# Patient Record
Sex: FEMALE | Race: WHITE | NOT HISPANIC OR LATINO | URBAN - METROPOLITAN AREA
[De-identification: names, ages, dates, MRNs, and addresses within clinical notes are randomized per-mention and may not be internally consistent; named-entity substitution may affect disease eponyms.]

---

## 2023-07-06 ENCOUNTER — OFFICE VISIT (OUTPATIENT)
Dept: PRIMARY CARE | Facility: CLINIC | Age: 68
End: 2023-07-06
Payer: MEDICARE

## 2023-07-06 VITALS
BODY MASS INDEX: 36.44 KG/M2 | RESPIRATION RATE: 18 BRPM | WEIGHT: 219 LBS | OXYGEN SATURATION: 97 % | DIASTOLIC BLOOD PRESSURE: 78 MMHG | TEMPERATURE: 97.3 F | SYSTOLIC BLOOD PRESSURE: 131 MMHG | HEART RATE: 81 BPM

## 2023-07-06 DIAGNOSIS — E06.9 THYROIDITIS: ICD-10-CM

## 2023-07-06 DIAGNOSIS — M54.16 LUMBAR RADICULOPATHY: Primary | ICD-10-CM

## 2023-07-06 PROBLEM — M25.552 BILATERAL HIP PAIN: Status: ACTIVE | Noted: 2023-01-18

## 2023-07-06 PROBLEM — M17.0 PRIMARY OSTEOARTHRITIS OF BOTH KNEES: Status: ACTIVE | Noted: 2022-12-06

## 2023-07-06 PROBLEM — E66.9 OBESITY: Status: ACTIVE | Noted: 2023-07-06

## 2023-07-06 PROBLEM — L25.9 CONTACT DERMATITIS: Status: ACTIVE | Noted: 2023-07-06

## 2023-07-06 PROBLEM — M25.559 GREATER TROCHANTERIC PAIN SYNDROME: Status: ACTIVE | Noted: 2023-06-29

## 2023-07-06 PROBLEM — F41.9 ANXIETY: Status: ACTIVE | Noted: 2023-07-06

## 2023-07-06 PROBLEM — J45.909 ASTHMA (HHS-HCC): Status: ACTIVE | Noted: 2023-07-06

## 2023-07-06 PROBLEM — M25.50 JOINT PAIN: Status: ACTIVE | Noted: 2023-07-06

## 2023-07-06 PROBLEM — T22.212A SECOND DEGREE BURN OF LEFT FOREARM: Status: ACTIVE | Noted: 2018-12-27

## 2023-07-06 PROBLEM — I88.9 CERVICAL LYMPHADENITIS: Status: ACTIVE | Noted: 2020-04-14

## 2023-07-06 PROBLEM — D17.9 LIPOMA: Status: ACTIVE | Noted: 2023-07-06

## 2023-07-06 PROBLEM — M25.551 BILATERAL HIP PAIN: Status: ACTIVE | Noted: 2023-01-18

## 2023-07-06 PROBLEM — K13.0 CHEILITIS: Status: ACTIVE | Noted: 2023-07-06

## 2023-07-06 PROBLEM — E78.5 HYPERLIPIDEMIA: Status: ACTIVE | Noted: 2023-07-06

## 2023-07-06 PROBLEM — S39.012A LUMBAR STRAIN: Status: ACTIVE | Noted: 2023-07-06

## 2023-07-06 PROCEDURE — 1036F TOBACCO NON-USER: CPT | Performed by: FAMILY MEDICINE

## 2023-07-06 PROCEDURE — 1159F MED LIST DOCD IN RCRD: CPT | Performed by: FAMILY MEDICINE

## 2023-07-06 PROCEDURE — 99214 OFFICE O/P EST MOD 30 MIN: CPT | Performed by: FAMILY MEDICINE

## 2023-07-06 RX ORDER — ESCITALOPRAM OXALATE 10 MG/1
10 TABLET ORAL DAILY
COMMUNITY

## 2023-07-06 RX ORDER — PREDNISONE 10 MG/1
TABLET ORAL
Qty: 45 TABLET | Refills: 0 | Status: SHIPPED | OUTPATIENT
Start: 2023-07-06 | End: 2023-07-21

## 2023-07-06 RX ORDER — LORAZEPAM 0.5 MG/1
1 TABLET ORAL 3 TIMES DAILY PRN
COMMUNITY
Start: 2021-10-20

## 2023-07-06 ASSESSMENT — ENCOUNTER SYMPTOMS
OCCASIONAL FEELINGS OF UNSTEADINESS: 0
DEPRESSION: 0
LOSS OF SENSATION IN FEET: 0

## 2023-07-06 NOTE — PROGRESS NOTES
Subjective   Patient ID: Deirdre Mascorro is a 67 y.o. female who presents for Hip Pain (left) and Follow-up (Wants thyroid U/S).  HPI    Has cortisone injection about 2 weeks ago, which continues to help for her left lateral hip pain however it has not resolved her left lower buttock pain that radiates into her left leg.  She states this has been worsening over the last few weeks.  She denies any bowel or bladder incontinence.      Patient is due for repeat ultrasound of her thyroid    Patient overall states her mood has been okay she is tolerating her Lexapro well with good compliance she denies any adverse effects.      Review of Systems    Objective   Physical Exam  Constitutional:       Appearance: Normal appearance.   Cardiovascular:      Rate and Rhythm: Normal rate and regular rhythm.   Pulmonary:      Effort: Pulmonary effort is normal.      Breath sounds: Normal breath sounds.   Abdominal:      General: Abdomen is flat. Bowel sounds are normal.      Palpations: Abdomen is soft.   Musculoskeletal:      Cervical back: Normal range of motion and neck supple.      Comments: Inspection of lumbar spine normal, no tenderness to palpation, full range of motion in lumbar spine   Skin:     General: Skin is warm and dry.   Neurological:      Mental Status: She is alert.   Psychiatric:         Mood and Affect: Mood normal.         Behavior: Behavior normal.         Assessment/Plan   Problem List Items Addressed This Visit       Thyroiditis - Primary    Relevant Orders    US thyroid    Lumbar radiculopathy    Relevant Medications    predniSONE (Deltasone) 10 mg tablet    Other Relevant Orders    XR lumbar spine 2-3 views    Referral to Physical Therapy

## 2023-09-05 ENCOUNTER — OFFICE VISIT (OUTPATIENT)
Dept: PRIMARY CARE | Facility: CLINIC | Age: 68
End: 2023-09-05
Payer: MEDICARE

## 2023-09-05 VITALS
WEIGHT: 225 LBS | RESPIRATION RATE: 18 BRPM | SYSTOLIC BLOOD PRESSURE: 130 MMHG | OXYGEN SATURATION: 96 % | TEMPERATURE: 97.6 F | DIASTOLIC BLOOD PRESSURE: 82 MMHG | BODY MASS INDEX: 37.44 KG/M2 | HEART RATE: 74 BPM

## 2023-09-05 DIAGNOSIS — M25.552 LEFT HIP PAIN: ICD-10-CM

## 2023-09-05 DIAGNOSIS — J45.20 MILD INTERMITTENT ASTHMA, UNSPECIFIED WHETHER COMPLICATED (HHS-HCC): ICD-10-CM

## 2023-09-05 DIAGNOSIS — M54.16 LUMBAR RADICULOPATHY: Primary | ICD-10-CM

## 2023-09-05 PROCEDURE — 99214 OFFICE O/P EST MOD 30 MIN: CPT | Performed by: FAMILY MEDICINE

## 2023-09-05 PROCEDURE — 1036F TOBACCO NON-USER: CPT | Performed by: FAMILY MEDICINE

## 2023-09-05 PROCEDURE — 90662 IIV NO PRSV INCREASED AG IM: CPT | Performed by: FAMILY MEDICINE

## 2023-09-05 PROCEDURE — G0008 ADMIN INFLUENZA VIRUS VAC: HCPCS | Performed by: FAMILY MEDICINE

## 2023-09-05 PROCEDURE — 1159F MED LIST DOCD IN RCRD: CPT | Performed by: FAMILY MEDICINE

## 2023-09-05 RX ORDER — GABAPENTIN 300 MG/1
300 CAPSULE ORAL NIGHTLY
Qty: 30 CAPSULE | Refills: 2 | Status: SHIPPED | OUTPATIENT
Start: 2023-09-05 | End: 2023-12-04

## 2023-09-05 RX ORDER — ALBUTEROL SULFATE 90 UG/1
2 AEROSOL, METERED RESPIRATORY (INHALATION) EVERY 4 HOURS PRN
Qty: 8.5 G | Refills: 3 | Status: SHIPPED | OUTPATIENT
Start: 2023-09-05 | End: 2024-09-04

## 2023-09-05 NOTE — PROGRESS NOTES
Subjective   Patient ID: Deirdre Mascorro is a 67 y.o. female who presents for Hip Pain (Left x 2 months).  HPI    Left hip pain worsening, seems to start in lower back, wrap around groin into anterior thigh and anterior lower leg.  Patient states it often feels like she is going to give out on her although it has not yet.    Patient denies any bowel or bladder dysfunction    Course of prednisone in July did not help     Has not yet started physical therapy    Review of Systems    Objective   Physical Exam  Constitutional:       Appearance: Normal appearance.   Cardiovascular:      Rate and Rhythm: Normal rate and regular rhythm.   Pulmonary:      Effort: Pulmonary effort is normal.      Breath sounds: Normal breath sounds.   Musculoskeletal:      Comments: Inspection of lumbar spine normal, tender to palpation over left SI joint and left lumbar paraspinals.  Some discomfort with forward flexion and extension.   Skin:     General: Skin is warm and dry.   Neurological:      Mental Status: She is alert.   Psychiatric:         Mood and Affect: Mood normal.         Behavior: Behavior normal.         Assessment/Plan   Problem List Items Addressed This Visit       Asthma    Relevant Medications    albuterol (ProAir HFA) 90 mcg/actuation inhaler    Left hip pain    Lumbar radiculopathy - Primary    Relevant Medications    gabapentin (Neurontin) 300 mg capsule    Other Relevant Orders    Referral to Physical Therapy

## 2023-09-07 DIAGNOSIS — M16.0 PRIMARY OSTEOARTHRITIS OF BOTH HIPS: Primary | ICD-10-CM

## 2023-09-22 ENCOUNTER — TELEPHONE (OUTPATIENT)
Dept: PRIMARY CARE | Facility: CLINIC | Age: 68
End: 2023-09-22
Payer: MEDICARE

## 2023-09-22 NOTE — TELEPHONE ENCOUNTER
Irma called regarding her hip pain.  She said that you had referred her to a Dr. Ramírez who does not do hips.  She would like for you to look over her medications (which aren't doing her any good) and let her know if there are any she can take together or what you would recommend?  She would like for you to give her a call back.  Thank you

## 2023-09-22 NOTE — TELEPHONE ENCOUNTER
Spoke to patient, continue with the steroid until complete, then begin meloxicam daily.  She may also take Tylenol up to 4 times daily and use Percocet as needed.  She does have an old prescription from when she fractured her elbow.  She feels like she will only need this to get home she is flying out to [place] tomorrow.  Patient does have an appointment with a hip surgeon on Tuesday in [place].

## 2023-09-28 ENCOUNTER — TELEPHONE (OUTPATIENT)
Dept: PRIMARY CARE | Facility: CLINIC | Age: 68
End: 2023-09-28
Payer: MEDICARE

## 2023-09-28 DIAGNOSIS — R92.8 ABNORMAL MAMMOGRAM OF BOTH BREASTS: Primary | ICD-10-CM

## 2023-09-28 NOTE — TELEPHONE ENCOUNTER
[facility] [place] phoned, [phone]. They are requesting that we fax orders for Deirdre Mascorro for a mammogram diagnostic bilateral test to fax number: [phone]. She has an appointment on Monday morning. I can fax these when ready.

## 2023-10-27 ENCOUNTER — APPOINTMENT (OUTPATIENT)
Dept: ORTHOPEDIC SURGERY | Facility: CLINIC | Age: 68
End: 2023-10-27
Payer: MEDICARE

## 2023-12-21 ENCOUNTER — APPOINTMENT (OUTPATIENT)
Dept: PRIMARY CARE | Facility: CLINIC | Age: 68
End: 2023-12-21
Payer: MEDICARE

## 2025-05-13 ENCOUNTER — APPOINTMENT (OUTPATIENT)
Dept: PRIMARY CARE | Facility: CLINIC | Age: 70
End: 2025-05-13
Payer: MEDICARE

## 2025-05-13 VITALS
HEIGHT: 67 IN | TEMPERATURE: 96.9 F | HEART RATE: 84 BPM | DIASTOLIC BLOOD PRESSURE: 72 MMHG | SYSTOLIC BLOOD PRESSURE: 117 MMHG | BODY MASS INDEX: 34.69 KG/M2 | WEIGHT: 221 LBS | RESPIRATION RATE: 16 BRPM | OXYGEN SATURATION: 98 %

## 2025-05-13 DIAGNOSIS — J45.20 MILD INTERMITTENT ASTHMA, UNSPECIFIED WHETHER COMPLICATED (HHS-HCC): Primary | ICD-10-CM

## 2025-05-13 DIAGNOSIS — E66.811 OBESITY (BMI 30.0-34.9): ICD-10-CM

## 2025-05-13 DIAGNOSIS — E04.1 THYROID NODULE: ICD-10-CM

## 2025-05-13 PROBLEM — E66.9 OBESITY: Status: RESOLVED | Noted: 2023-07-06 | Resolved: 2025-05-13

## 2025-05-13 PROCEDURE — G2211 COMPLEX E/M VISIT ADD ON: HCPCS | Performed by: FAMILY MEDICINE

## 2025-05-13 PROCEDURE — 99214 OFFICE O/P EST MOD 30 MIN: CPT | Performed by: FAMILY MEDICINE

## 2025-05-13 PROCEDURE — 1036F TOBACCO NON-USER: CPT | Performed by: FAMILY MEDICINE

## 2025-05-13 PROCEDURE — 3008F BODY MASS INDEX DOCD: CPT | Performed by: FAMILY MEDICINE

## 2025-05-13 PROCEDURE — 1159F MED LIST DOCD IN RCRD: CPT | Performed by: FAMILY MEDICINE

## 2025-05-13 RX ORDER — CELECOXIB 200 MG/1
1 CAPSULE ORAL
COMMUNITY
Start: 2025-02-06

## 2025-05-13 RX ORDER — ALBUTEROL SULFATE 90 UG/1
2 INHALANT RESPIRATORY (INHALATION) EVERY 4 HOURS PRN
Qty: 8.5 G | Refills: 3 | Status: SHIPPED | OUTPATIENT
Start: 2025-05-13 | End: 2026-05-13

## 2025-05-13 RX ORDER — HYDROCHLOROTHIAZIDE 25 MG/1
1 TABLET ORAL
COMMUNITY
Start: 2025-03-19

## 2025-05-13 RX ORDER — FLUTICASONE FUROATE AND VILANTEROL 100; 25 UG/1; UG/1
1 POWDER RESPIRATORY (INHALATION) DAILY
Qty: 60 EACH | Refills: 5 | Status: SHIPPED | OUTPATIENT
Start: 2025-05-13 | End: 2025-05-14 | Stop reason: SDUPTHER

## 2025-05-13 ASSESSMENT — PATIENT HEALTH QUESTIONNAIRE - PHQ9
SUM OF ALL RESPONSES TO PHQ9 QUESTIONS 1 AND 2: 0
2. FEELING DOWN, DEPRESSED OR HOPELESS: NOT AT ALL
1. LITTLE INTEREST OR PLEASURE IN DOING THINGS: NOT AT ALL

## 2025-05-13 NOTE — ASSESSMENT & PLAN NOTE
Orders:    fluticasone furoate-vilanteroL (Breo Ellipta) 100-25 mcg/dose inhaler; Inhale 1 puff once daily.    albuterol (ProAir HFA) 90 mcg/actuation inhaler; Inhale 2 puffs every 4 hours if needed for wheezing or shortness of breath.

## 2025-05-13 NOTE — ASSESSMENT & PLAN NOTE
Orders:    tirzepatide, weight loss, (Zepbound) 2.5 mg/0.5 mL injection; Inject 2.5 mg under the skin every 7 days.  If Zepbound is not covered we will call in topiramate

## 2025-05-13 NOTE — PROGRESS NOTES
Subjective   Patient ID: Deirdre Mascorro is a 69 y.o. female who presents for questions about meds and Asthma.  HPI    Dry cough worse for about 1 month, breathing was better when at sons where it was cooler.  She states that she frequently feels short of breath particularly if she has been more active or even if she is standing still but she thinks sometimes she does not take deep enough breaths.  She is unaware of any wheezing.      Activity has just started to  after her bilateral hip replacements, but she is starting to walk more.  She states that she was eating more salads focusing on protein and vegetables without significant weight loss.  She is been following this diet for approximately a year or more     Review of Systems    Objective   Physical Exam  Constitutional:       Appearance: Normal appearance.   Cardiovascular:      Rate and Rhythm: Normal rate and regular rhythm.   Pulmonary:      Effort: Pulmonary effort is normal.      Breath sounds: Normal breath sounds.   Abdominal:      General: Abdomen is flat. Bowel sounds are normal.      Palpations: Abdomen is soft.   Skin:     General: Skin is warm and dry.   Neurological:      Mental Status: She is alert.   Psychiatric:         Mood and Affect: Mood normal.         Behavior: Behavior normal.         Assessment & Plan  Thyroid nodule    Orders:    US thyroid; Future    Mild intermittent asthma, unspecified whether complicated ([facility])    Orders:    fluticasone furoate-vilanteroL (Breo Ellipta) 100-25 mcg/dose inhaler; Inhale 1 puff once daily.    albuterol (ProAir HFA) 90 mcg/actuation inhaler; Inhale 2 puffs every 4 hours if needed for wheezing or shortness of breath.    Obesity (BMI 30.0-34.9)    Orders:    tirzepatide, weight loss, (Zepbound) 2.5 mg/0.5 mL injection; Inject 2.5 mg under the skin every 7 days.  If Zepbound is not covered we will call in topiramate     Follow-up in approximately 6 weeks

## 2025-05-14 DIAGNOSIS — J45.20 MILD INTERMITTENT ASTHMA, UNSPECIFIED WHETHER COMPLICATED (HHS-HCC): ICD-10-CM

## 2025-05-14 RX ORDER — FLUTICASONE FUROATE AND VILANTEROL TRIFENATATE 100; 25 UG/1; UG/1
1 POWDER RESPIRATORY (INHALATION) DAILY
Qty: 60 EACH | Refills: 5 | Status: SHIPPED | OUTPATIENT
Start: 2025-05-14 | End: 2025-05-14

## 2025-05-14 RX ORDER — FLUTICASONE PROPIONATE AND SALMETEROL 250; 50 UG/1; UG/1
1 POWDER RESPIRATORY (INHALATION)
Qty: 60 EACH | Refills: 11 | Status: SHIPPED | OUTPATIENT
Start: 2025-05-14 | End: 2026-05-14

## 2025-05-15 ENCOUNTER — APPOINTMENT (OUTPATIENT)
Dept: RADIOLOGY | Facility: CLINIC | Age: 70
End: 2025-05-15
Payer: MEDICARE

## 2025-05-15 DIAGNOSIS — E66.811 OBESITY (BMI 30.0-34.9): Primary | ICD-10-CM

## 2025-05-15 RX ORDER — TOPIRAMATE 25 MG/1
TABLET ORAL
Qty: 70 TABLET | Refills: 0 | Status: SHIPPED | OUTPATIENT
Start: 2025-05-15

## 2025-05-22 ENCOUNTER — HOSPITAL ENCOUNTER (OUTPATIENT)
Dept: RADIOLOGY | Facility: CLINIC | Age: 70
Discharge: HOME | End: 2025-05-22
Payer: MEDICARE

## 2025-05-22 DIAGNOSIS — E04.1 THYROID NODULE: ICD-10-CM

## 2025-05-22 PROCEDURE — 76536 US EXAM OF HEAD AND NECK: CPT | Performed by: RADIOLOGY

## 2025-05-22 PROCEDURE — 76536 US EXAM OF HEAD AND NECK: CPT

## 2025-06-15 DIAGNOSIS — E66.811 OBESITY (BMI 30.0-34.9): ICD-10-CM

## 2025-06-16 RX ORDER — TOPIRAMATE 25 MG/1
TABLET, FILM COATED ORAL
Qty: 70 TABLET | Refills: 0 | Status: SHIPPED | OUTPATIENT
Start: 2025-06-16 | End: 2025-06-16 | Stop reason: SDUPTHER

## 2025-06-16 RX ORDER — TOPIRAMATE 25 MG/1
50 TABLET, FILM COATED ORAL 2 TIMES DAILY
Qty: 120 TABLET | Refills: 0 | Status: SHIPPED | OUTPATIENT
Start: 2025-06-16

## 2025-06-30 ENCOUNTER — APPOINTMENT (OUTPATIENT)
Dept: PRIMARY CARE | Facility: CLINIC | Age: 70
End: 2025-06-30
Payer: MEDICARE

## 2025-07-02 NOTE — PROGRESS NOTES
Subjective   Patient ID: Deirdre Mascorro is a 69 y.o. female who presents for Hand Pain (Bilateral) and Difficulty Urinating.  HPI    For the past 1 to 2 days patient has noticed a little bit of change in the color of her urine, some sediment in her urine a mild low back ache.  She denies any fevers or chills.  No nausea vomiting diarrhea or constipation    Patient has also noticed significant pain in her fingers bilaterally.  She also has noticed that sometimes her fingers get stuck in flexion.  Overall this does improve with Celebrex most of the time but seems to be getting worse.  Denies any specific redness no warmth to touch    Review of Systems    Objective   Physical Exam  Constitutional:       Appearance: Normal appearance.   Cardiovascular:      Rate and Rhythm: Normal rate and regular rhythm.   Pulmonary:      Effort: Pulmonary effort is normal.      Breath sounds: Normal breath sounds.   Neurological:      Mental Status: She is alert.      Comments: Trigger finger appreciated on both hands, tenderness to palpation over MCP joint on the right hand         Assessment & Plan  Dysuria    Orders:    POCT UA (nonautomated) manually resulted    nitrofurantoin, macrocrystal-monohydrate, (Macrobid) 100 mg capsule; Take 1 capsule (100 mg) by mouth 2 times a day for 7 days.    Urine Culture    Trigger finger, unspecified finger, unspecified laterality    Orders:    Referral to Orthopedics and Sports Medicine; Future

## 2025-07-03 ENCOUNTER — OFFICE VISIT (OUTPATIENT)
Dept: PRIMARY CARE | Facility: CLINIC | Age: 70
End: 2025-07-03
Payer: MEDICARE

## 2025-07-03 VITALS
HEART RATE: 68 BPM | SYSTOLIC BLOOD PRESSURE: 131 MMHG | RESPIRATION RATE: 16 BRPM | TEMPERATURE: 97.8 F | BODY MASS INDEX: 34.9 KG/M2 | WEIGHT: 222.8 LBS | OXYGEN SATURATION: 99 % | DIASTOLIC BLOOD PRESSURE: 73 MMHG

## 2025-07-03 DIAGNOSIS — R30.0 DYSURIA: Primary | ICD-10-CM

## 2025-07-03 DIAGNOSIS — M65.30 TRIGGER FINGER, UNSPECIFIED FINGER, UNSPECIFIED LATERALITY: ICD-10-CM

## 2025-07-03 LAB
POC APPEARANCE, URINE: CLEAR
POC BILIRUBIN, URINE: NEGATIVE
POC BLOOD, URINE: NEGATIVE
POC COLOR, URINE: YELLOW
POC GLUCOSE, URINE: NEGATIVE MG/DL
POC KETONES, URINE: NEGATIVE MG/DL
POC LEUKOCYTES, URINE: ABNORMAL
POC NITRITE,URINE: NEGATIVE
POC PH, URINE: 6.5 PH
POC PROTEIN, URINE: NEGATIVE MG/DL
POC SPECIFIC GRAVITY, URINE: 1.02
POC UROBILINOGEN, URINE: 0.2 EU/DL

## 2025-07-03 PROCEDURE — 1159F MED LIST DOCD IN RCRD: CPT | Performed by: FAMILY MEDICINE

## 2025-07-03 PROCEDURE — 81002 URINALYSIS NONAUTO W/O SCOPE: CPT | Performed by: FAMILY MEDICINE

## 2025-07-03 PROCEDURE — 99213 OFFICE O/P EST LOW 20 MIN: CPT | Performed by: FAMILY MEDICINE

## 2025-07-03 PROCEDURE — G2211 COMPLEX E/M VISIT ADD ON: HCPCS | Performed by: FAMILY MEDICINE

## 2025-07-03 PROCEDURE — 1036F TOBACCO NON-USER: CPT | Performed by: FAMILY MEDICINE

## 2025-07-03 RX ORDER — NITROFURANTOIN 25; 75 MG/1; MG/1
100 CAPSULE ORAL 2 TIMES DAILY
Qty: 14 CAPSULE | Refills: 0 | Status: SHIPPED | OUTPATIENT
Start: 2025-07-03 | End: 2025-07-10

## 2025-07-03 ASSESSMENT — PATIENT HEALTH QUESTIONNAIRE - PHQ9
2. FEELING DOWN, DEPRESSED OR HOPELESS: NOT AT ALL
SUM OF ALL RESPONSES TO PHQ9 QUESTIONS 1 AND 2: 0
1. LITTLE INTEREST OR PLEASURE IN DOING THINGS: NOT AT ALL

## 2025-07-05 LAB — BACTERIA UR CULT: NORMAL

## 2025-07-23 ENCOUNTER — OFFICE VISIT (OUTPATIENT)
Dept: ORTHOPEDIC SURGERY | Facility: CLINIC | Age: 70
End: 2025-07-23
Payer: MEDICARE

## 2025-07-23 DIAGNOSIS — M65.311 TRIGGER FINGER OF RIGHT THUMB: ICD-10-CM

## 2025-07-23 DIAGNOSIS — M65.342 TRIGGER RING FINGER OF LEFT HAND: ICD-10-CM

## 2025-07-23 DIAGNOSIS — M65.312 TRIGGER FINGER OF LEFT THUMB: Primary | ICD-10-CM

## 2025-07-23 PROCEDURE — 99211 OFF/OP EST MAY X REQ PHY/QHP: CPT | Mod: 25 | Performed by: ORTHOPAEDIC SURGERY

## 2025-07-23 PROCEDURE — 1159F MED LIST DOCD IN RCRD: CPT | Performed by: ORTHOPAEDIC SURGERY

## 2025-07-23 PROCEDURE — 1125F AMNT PAIN NOTED PAIN PRSNT: CPT | Performed by: ORTHOPAEDIC SURGERY

## 2025-07-23 PROCEDURE — 1160F RVW MEDS BY RX/DR IN RCRD: CPT | Performed by: ORTHOPAEDIC SURGERY

## 2025-07-23 PROCEDURE — 1036F TOBACCO NON-USER: CPT | Performed by: ORTHOPAEDIC SURGERY

## 2025-07-23 PROCEDURE — 2500000004 HC RX 250 GENERAL PHARMACY W/ HCPCS (ALT 636 FOR OP/ED): Performed by: ORTHOPAEDIC SURGERY

## 2025-07-23 PROCEDURE — 20550 NJX 1 TENDON SHEATH/LIGAMENT: CPT | Mod: 50 | Performed by: ORTHOPAEDIC SURGERY

## 2025-07-23 PROCEDURE — 99203 OFFICE O/P NEW LOW 30 MIN: CPT | Performed by: ORTHOPAEDIC SURGERY

## 2025-07-23 RX ADMIN — TRIAMCINOLONE ACETONIDE 20 MG: 40 INJECTION, SUSPENSION INTRA-ARTICULAR; INTRAMUSCULAR at 07:37

## 2025-07-23 RX ADMIN — LIDOCAINE HYDROCHLORIDE 0.5 ML: 10 INJECTION, SOLUTION INFILTRATION; PERINEURAL at 07:37

## 2025-07-23 ASSESSMENT — PAIN - FUNCTIONAL ASSESSMENT: PAIN_FUNCTIONAL_ASSESSMENT: 0-10

## 2025-07-23 ASSESSMENT — PAIN SCALES - GENERAL: PAINLEVEL_OUTOF10: 7

## 2025-07-23 NOTE — LETTER
July 25, 2025     Emma Garza MD  [address]  [facility] [address]    Patient: Deirdre Mascorro   YOB: 1955   Date of Visit: 7/23/2025       Dear Dr. Emma Garza MD:    Thank you for referring Deirdre Mascorro to me for evaluation. Below are my notes for this consultation.  If you have questions, please do not hesitate to call me. I look forward to following your patient along with you.       Sincerely,     Kevin Valle MD      CC: No Recipients  ______________________________________________________________________________________    CHIEF COMPLAINT         Trigger pain    ASSESSMENT + PLAN    Bilateral thumb and early left ring trigger digits    The nature of trigger finger was reviewed, along with the natural history.  I reviewed the options for management, including observation, nonsteroidals, splinting, oral steroids, cortisone injection, or surgical release, along with the likely success rates of each.  I reviewed the risks of cortisone injection, including infection, hypopigmentation, and fat atrophy.  I cautioned the patient to avoid hot objects where the finger could be burned, if it becomes insensate temporarily from the lidocaine. The transient cortisone effect on blood glucose measurement was also reviewed, and the patient wished to proceed.    After sterile prep, I injected 20 mg of Kenalog and 0.5 cc of 1% lidocaine, plain to the flexor sheath of each thumb.    Patient will followup in 4 weeks if still symptomatic, or with any concerns.        HISTORY OF PRESENT ILLNESS       Patient is a 69 y.o. 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  This began in January as she was more dependent on a cane following her recent hip replacement.  More recently, she has had some pain and rare catching of the left ring finger.  Symptoms are primarily in the morning.  The thumbs have occasionally required  use of the opposite hand for extension.  No recalled trauma on either side.  No numbness or tingling.  No noted weakness.  Symptoms loosen up during the day.    She is not diabetic or hypothyroid.  She does not smoke.      REVIEW OF SYSTEMS       A 30-item multi-system Review Of Systems was obtained on today's intake form.  This was reviewed with the patient and is correct.  The pertinent positives and negatives are listed above.  The form has been scanned separately into the medical record.      PHYSICAL EXAM    Constitutional:    Appears stated age. Well-developed and well-nourished obese female in no acute distress.  Psychiatric:         Pleasant normal mood and affect. Behavior is appropriate for the situation.   Head:                   Normocephalic and atraumatic.  Eyes:                    Pupils are equal and round.  Cardiovascular:  2+ radial and ulnar pulses. Fingers well-perfused.  Respiratory:        Effort normal. No respiratory distress. Speaking in complete sentences.  Neurologic:       Alert and oriented to person, place, and time.  Skin:                Skin is intact, warm and dry.  Hematologic / Lymphatic:    No lymphedema or lymphangitis.    Extremities / Musculoskeletal:                      Skin of both hands and wrists is intact with no erythema, ecchymosis, or diffuse swelling.  Normal skin drag and coloration.  Full composite finger flexion extension with good intrinsic plus minus posture bilaterally.  Palpable flexor nodules and A1 tenderness to the base of both thumbs.  Nodule but no tenderness of the left ring.  Left ring does not trigger even with sequential extension test.  Both thumbs trigger spontaneously.  No de Quervain's or CMC signs.  Negative French.  Negative midcarpal shift.  Symmetric wrist and forearm motion.  Sensation intact to light touch in all distributions.  Capillary refill less than 2 seconds.      IMAGING / LABS / EMGs           None today      Medical  History[1]    Medication Documentation Review Audit       Reviewed by Kevin Valle MD (Physician) on 07/25/25 at 0735      Medication Order Taking? Sig Documenting Provider Last Dose Status   albuterol (ProAir HFA) 90 mcg/actuation inhaler [id number]  Inhale 2 puffs every 4 hours if needed for wheezing or shortness of breath. Emma Garza MD  Active   celecoxib (CeleBREX) 200 mg capsule [id number]  Take 1 capsule (200 mg) by mouth every 12 hours.   Patient taking differently: Take 1 capsule (200 mg) by mouth once daily.    Historical Provider, MD  Active   escitalopram (Lexapro) 10 mg tablet [id number] No Take 1 tablet (10 mg) by mouth once daily. Historical Provider, MD Taking Active   fluticasone propion-salmeteroL (Advair Diskus) 250-50 mcg/dose diskus inhaler [id number]  Inhale 1 puff 2 times a day. Rinse mouth with water after use to reduce aftertaste and incidence of candidiasis. Do not swallow. Emma Garza MD  Active   hydroCHLOROthiazide (HYDRODiuril) 25 mg tablet [id number]  Take 1 tablet (25 mg) by mouth early in the morning.. Historical Provider, MD  Active   topiramate (Topamax) 25 mg tablet [id number]  Take 2 tablets (50 mg) by mouth 2 times a day.   Patient not taking: Reported on 7/3/2025    Emma Garza MD  Active                    RX Allergies[2]    Social History     Socioeconomic History   • Marital status:      Spouse name: Brook   • Number of children: Not on file   • Years of education: Not on file   • Highest education level: Not on file   Occupational History   • Occupation: retired     Comment: teacher   Tobacco Use   • Smoking status: Never   • Smokeless tobacco: Never   Substance and Sexual Activity   • Alcohol use: Yes     Comment: social   • Drug use: Never   • Sexual activity: Not on file   Other Topics Concern   • Not on file   Social History Narrative   • Not on file     Social Drivers of Health     Financial Resource Strain: Not on file   Food Insecurity: Not on file    Transportation Needs: Not on file   Physical Activity: Not on file   Stress: Not on file   Social Connections: Not on file   Intimate Partner Violence: Not on file   Housing Stability: Not on file       Surgical History[3]      PROCEDURE    Hand / UE Inj/Asp: bilateral thumb A1 for trigger finger on 7/23/2025 7:37 AM  Indications: therapeutic  Details: 25 G needle, volar approach  Medications (Right): 20 mg triamcinolone acetonide 40 mg/mL; 0.5 mL lidocaine 10 mg/mL (1 %)  Medications (Left): 20 mg triamcinolone acetonide 40 mg/mL; 0.5 mL lidocaine 10 mg/mL (1 %)  Outcome: tolerated well, no immediate complications  Procedure, treatment alternatives, risks and benefits explained, specific risks discussed. Consent was given by the patient.           Electronically Signed      MALACHI Valle MD      Orthopaedic Hand Surgery      [phone]       [1]  Past Medical History:  Diagnosis Date   • Diseases of lips 10/20/2021    Cheilitis   • Personal history of other diseases of the musculoskeletal system and connective tissue 06/04/2020    History of joint pain   • Personal history of other diseases of the nervous system and sense organs 04/06/2020    History of episcleritis   • Personal history of other diseases of the respiratory system     History of bronchitis   [2]  Allergies  Allergen Reactions   • Penicillins Anaphylaxis, Hives and Other   • Pollen Extracts Other   [3]  Past Surgical History:  Procedure Laterality Date   • OTHER SURGICAL HISTORY  07/21/2015    Near-Total Thyroidectomy

## 2025-07-25 PROBLEM — M65.311 TRIGGER FINGER OF RIGHT THUMB: Status: ACTIVE | Noted: 2025-07-25

## 2025-07-25 PROBLEM — M65.312 TRIGGER FINGER OF LEFT THUMB: Status: ACTIVE | Noted: 2025-07-25

## 2025-07-25 PROBLEM — M65.342 TRIGGER RING FINGER OF LEFT HAND: Status: ACTIVE | Noted: 2025-07-25

## 2025-07-25 RX ORDER — TRIAMCINOLONE ACETONIDE 40 MG/ML
20 INJECTION, SUSPENSION INTRA-ARTICULAR; INTRAMUSCULAR
Status: COMPLETED | OUTPATIENT
Start: 2025-07-23 | End: 2025-07-23

## 2025-07-25 RX ORDER — LIDOCAINE HYDROCHLORIDE 10 MG/ML
0.5 INJECTION, SOLUTION INFILTRATION; PERINEURAL
Status: COMPLETED | OUTPATIENT
Start: 2025-07-23 | End: 2025-07-23

## 2025-07-25 NOTE — PROGRESS NOTES
CHIEF COMPLAINT         Trigger pain    ASSESSMENT + PLAN    Bilateral thumb and early left ring trigger digits    The nature of trigger finger was reviewed, along with the natural history.  I reviewed the options for management, including observation, nonsteroidals, splinting, oral steroids, cortisone injection, or surgical release, along with the likely success rates of each.  I reviewed the risks of cortisone injection, including infection, hypopigmentation, and fat atrophy.  I cautioned the patient to avoid hot objects where the finger could be burned, if it becomes insensate temporarily from the lidocaine. The transient cortisone effect on blood glucose measurement was also reviewed, and the patient wished to proceed.    After sterile prep, I injected 20 mg of Kenalog and 0.5 cc of 1% lidocaine, plain to the flexor sheath of each thumb.    Patient will followup in 4 weeks if still symptomatic, or with any concerns.        HISTORY OF PRESENT ILLNESS       Patient is a 69 y.o. 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  This began in January as she was more dependent on a cane following her recent hip replacement.  More recently, she has had some pain and rare catching of the left ring finger.  Symptoms are primarily in the morning.  The thumbs have occasionally required use of the opposite hand for extension.  No recalled trauma on either side.  No numbness or tingling.  No noted weakness.  Symptoms loosen up during the day.    She is not diabetic or hypothyroid.  She does not smoke.      REVIEW OF SYSTEMS       A 30-item multi-system Review Of Systems was obtained on today's intake form.  This was reviewed with the patient and is correct.  The pertinent positives and negatives are listed above.  The form has been scanned separately into the medical record.      PHYSICAL EXAM    Constitutional:    Appears stated age.  Well-developed and well-nourished obese female in no acute distress.  Psychiatric:         Pleasant normal mood and affect. Behavior is appropriate for the situation.   Head:                   Normocephalic and atraumatic.  Eyes:                    Pupils are equal and round.  Cardiovascular:  2+ radial and ulnar pulses. Fingers well-perfused.  Respiratory:        Effort normal. No respiratory distress. Speaking in complete sentences.  Neurologic:       Alert and oriented to person, place, and time.  Skin:                Skin is intact, warm and dry.  Hematologic / Lymphatic:    No lymphedema or lymphangitis.    Extremities / Musculoskeletal:                      Skin of both hands and wrists is intact with no erythema, ecchymosis, or diffuse swelling.  Normal skin drag and coloration.  Full composite finger flexion extension with good intrinsic plus minus posture bilaterally.  Palpable flexor nodules and A1 tenderness to the base of both thumbs.  Nodule but no tenderness of the left ring.  Left ring does not trigger even with sequential extension test.  Both thumbs trigger spontaneously.  No de Quervain's or CMC signs.  Negative French.  Negative midcarpal shift.  Symmetric wrist and forearm motion.  Sensation intact to light touch in all distributions.  Capillary refill less than 2 seconds.      IMAGING / LABS / EMGs           None today      Medical History[1]    Medication Documentation Review Audit       Reviewed by Kevin Valle MD (Physician) on 07/25/25 at 0735      Medication Order Taking? Sig Documenting Provider Last Dose Status   albuterol (ProAir HFA) 90 mcg/actuation inhaler [id number]  Inhale 2 puffs every 4 hours if needed for wheezing or shortness of breath. Emma Garza MD  Active   celecoxib (CeleBREX) 200 mg capsule [id number]  Take 1 capsule (200 mg) by mouth every 12 hours.   Patient taking differently: Take 1 capsule (200 mg) by mouth once daily.    Historical Provider, MD  Active    escitalopram (Lexapro) 10 mg tablet [id number] No Take 1 tablet (10 mg) by mouth once daily. Historical Provider, MD Taking Active   fluticasone propion-salmeteroL (Advair Diskus) 250-50 mcg/dose diskus inhaler [id number]  Inhale 1 puff 2 times a day. Rinse mouth with water after use to reduce aftertaste and incidence of candidiasis. Do not swallow. Emma Garza MD  Active   hydroCHLOROthiazide (HYDRODiuril) 25 mg tablet [id number]  Take 1 tablet (25 mg) by mouth early in the morning.. Historical Provider, MD  Active   topiramate (Topamax) 25 mg tablet [id number]  Take 2 tablets (50 mg) by mouth 2 times a day.   Patient not taking: Reported on 7/3/2025    Emma Garza MD  Active                    RX Allergies[2]    Social History     Socioeconomic History    Marital status:      Spouse name: Brook    Number of children: Not on file    Years of education: Not on file    Highest education level: Not on file   Occupational History    Occupation: retired     Comment: teacher   Tobacco Use    Smoking status: Never    Smokeless tobacco: Never   Substance and Sexual Activity    Alcohol use: Yes     Comment: social    Drug use: Never    Sexual activity: Not on file   Other Topics Concern    Not on file   Social History Narrative    Not on file     Social Drivers of Health     Financial Resource Strain: Not on file   Food Insecurity: Not on file   Transportation Needs: Not on file   Physical Activity: Not on file   Stress: Not on file   Social Connections: Not on file   Intimate Partner Violence: Not on file   Housing Stability: Not on file       Surgical History[3]      PROCEDURE    Hand / UE Inj/Asp: bilateral thumb A1 for trigger finger on 7/23/2025 7:37 AM  Indications: therapeutic  Details: 25 G needle, volar approach  Medications (Right): 20 mg triamcinolone acetonide 40 mg/mL; 0.5 mL lidocaine 10 mg/mL (1 %)  Medications (Left): 20 mg triamcinolone acetonide 40 mg/mL; 0.5 mL lidocaine 10 mg/mL (1  %)  Outcome: tolerated well, no immediate complications  Procedure, treatment alternatives, risks and benefits explained, specific risks discussed. Consent was given by the patient.           Electronically Signed      MALACHI Valle MD      Orthopaedic Hand Surgery      [phone]       [1]   Past Medical History:  Diagnosis Date    Diseases of lips 10/20/2021    Cheilitis    Personal history of other diseases of the musculoskeletal system and connective tissue 06/04/2020    History of joint pain    Personal history of other diseases of the nervous system and sense organs 04/06/2020    History of episcleritis    Personal history of other diseases of the respiratory system     History of bronchitis   [2]   Allergies  Allergen Reactions    Penicillins Anaphylaxis, Hives and Other    Pollen Extracts Other   [3]   Past Surgical History:  Procedure Laterality Date    OTHER SURGICAL HISTORY  07/21/2015    Near-Total Thyroidectomy